# Patient Record
Sex: FEMALE
[De-identification: names, ages, dates, MRNs, and addresses within clinical notes are randomized per-mention and may not be internally consistent; named-entity substitution may affect disease eponyms.]

---

## 2020-08-16 ENCOUNTER — HOSPITAL ENCOUNTER (EMERGENCY)
Dept: HOSPITAL 88 - FSED | Age: 20
Discharge: HOME | End: 2020-08-16
Payer: COMMERCIAL

## 2020-08-16 VITALS — WEIGHT: 203 LBS | BODY MASS INDEX: 34.66 KG/M2 | HEIGHT: 64 IN

## 2020-08-16 VITALS — SYSTOLIC BLOOD PRESSURE: 128 MMHG | DIASTOLIC BLOOD PRESSURE: 68 MMHG

## 2020-08-16 DIAGNOSIS — N34.2: ICD-10-CM

## 2020-08-16 DIAGNOSIS — N30.01: Primary | ICD-10-CM

## 2020-08-16 DIAGNOSIS — R35.0: ICD-10-CM

## 2020-08-16 DIAGNOSIS — R30.0: ICD-10-CM

## 2020-08-16 PROCEDURE — 85025 COMPLETE CBC W/AUTO DIFF WBC: CPT

## 2020-08-16 PROCEDURE — 96374 THER/PROPH/DIAG INJ IV PUSH: CPT

## 2020-08-16 PROCEDURE — 74176 CT ABD & PELVIS W/O CONTRAST: CPT

## 2020-08-16 PROCEDURE — 99284 EMERGENCY DEPT VISIT MOD MDM: CPT

## 2020-08-16 PROCEDURE — 80053 COMPREHEN METABOLIC PANEL: CPT

## 2020-08-16 PROCEDURE — 87186 SC STD MICRODIL/AGAR DIL: CPT

## 2020-08-16 PROCEDURE — 87086 URINE CULTURE/COLONY COUNT: CPT

## 2020-08-16 PROCEDURE — 81003 URINALYSIS AUTO W/O SCOPE: CPT

## 2020-08-16 NOTE — XMS REPORT
Continuity of Care Document

                             Created on: 2020



MAITE JEFFERSON

External Reference #: [id number]

: 2000

Sex: Female



Demographics





                          Address                   [address]

[place]  [address]

 

                          Home Phone                [phone]

 

                          Preferred Language        English

 

                          Marital Status            Unknown

 

                          Restorationism Affiliation     Unknown

 

                          Race                      Unknown

 

                          Ethnic Group              Unknown





Author





                          Author                    [facility]

 

                          Organization              [facility]

 

                          Address                   [address]

[place]  [address]



 

                          Phone                     Unavailable







Support





                Name            Relationship    Address         Phone

 

                    NONE,  GIVEN        PRS                 [address]

[place]  [address]                     [phone]

 

                    NONE,  GIVEN        PRS                 [address]

[place]  [address]                     [phone]

 

                    MAGO KEITH [employer]                 [address]

[address]

[place], [place]  [id number] [phone]







Care Team Providers





                    Care Team Member Name Role                Phone

 

                          Unavailable               Unavailable







Payers





           Payer Name Policy Type Policy Number Effective Date Expiration Date S

ource







Problems

This patient has no known problems.



Allergies, Adverse Reactions, Alerts





        Allergy Name Allergy Type Status  Severity Reaction(s) Onset Date Inacti

ve Date 

Treating Clinician        Comments                  Source

 

       No Known Allergies DA     Active U             2020 00:00:00       

               [facility]







Medications

This patient has no known medications.



Procedures

This patient has no known procedures.



Results





           Test Description Test Time  Test Comments Results    Result Comments 

Source

 

                    Novel Coronavirus 2019 nCoV 2020 20:40:00   

 

                                        Test Item

 

             Novel Coronavirus 2019 nCoV (test code = COVID19) Negative     Nega

tive                   





Does patient have the clinical criteria consistent with COVID-19? YIs the patien
t going to be discharged home? Y- XR CHEST 1 -16-29 00:37:00 FAX:         
Nan Mcmillan NP                   [place]: B   St: REG----------
---------------------------------------------------------------------  Name:   MAITE BROWER                  [facility]                     : [birth year]  Age/S: 20/F           [address]                Unit #: [id number]    
 [place]: [place]  [address]              Phys: Nan Mcmillan  NP   
                                              Acct: [id number] Dis Date:      
        Status: REG ER                                 PHONE #: [phone]    
Exam Date:     2020                   FAX #: [phone]     
Reason: COUGH                                              EXAMS:               
                               CPT CODE:      [id number] XR CHEST 1 V            
                  [address]                    EXAM:  - XR CHEST 1 V               
HISTORY: Cough.               COMPARISON: None available time of interpretation.
              FINDINGS:               Single AP view of the chest is provided.  
    Heart size and vascularity are within normal limits.        The lungs are 
clear of focal consolidation. No effusion,        pneumothorax, or acute osseous
abnormality.                         IMPRESSION:         No radiographic evid
ence of acute cardiopulmonary process.          ** Electronically Signed by Tonny Mckinney MD on 2020 at 0037 **                      Reported and signed 
by: Thanh Mckineny MD                        CC: Nan Mcmillan  NP             
                                                                                
                       Technologist: Adán KIDD(R)                          
           Trnscrd Date/Time/By: 2020 (0037) : By: [email or web address]          
Orig Print D/T: S: 2020 (0041)                         PAGE  1            
          Signed Report                               URINALYSIS COMPLETE
2020 00:21:00* 



             Test Item    Value        Reference Range Interpretation Comments

 

             UA COLOR (test code = COLU) Light-Yellow YELLOW                    

 

 

             UA APPEARANCE (test code = APPU) CLEAR        CLEAR                

      

 

             UA GLUCOSE DIPSTICK (test code = DGLUU) NEGATIVE mg/dL NEGATIVE    

               

 

             UA BILIRUBIN DIPSTICK (test code = BILU) NEGATIVE mg/dL NEGATIVE   

                

 

             UA KETONE DIPSTICK (test code = KETU) NEGATIVE mg/dL NEGATIVE      

             

 

             UA SPECIFIC GRAVITY (test code = SGU) 1.024        1.001-1.035     

           

 

             UA BLOOD DIPSTICK (test code = CORIN) Negative mg/dL NEGATIVE        

           

 

             UA PH DIPSTICK (test code = SWATHI) 8.0          5.0-8.0              

      

 

             UA PROTEIN DIPSTICK (test code = PROU) 10 (Trace) mg/dL NEGATIVE   

  A             

 

             UA UROBILINIOGEN DIPSTICK (test code = URO) 2.0 (1+) mg/dL NEGATIVE

     A             

 

             UA NITRITE DIPSTICK (test code = CHASIDY) NEGATIVE     NEGATIVE       

            

 

                    UA LEUKOCYTE ESTERASE W REFLEX (test code = LEUUR) 25 Clara/uL

 (Trace) Clara/uL 

NEGATIVE                  A                          

 

             UA WBC (test code = WBCU)  per HPF     0-5                        

 

             UA RBC (test code = RBCU)  per HPF     0-5                        

 

             UA EPITHELIAL CELLS (test code = EPIU)  per HPF     Few            

            

 

             UA BACTERIA (test code = BACU)  per HPF     NONE                   

    





Urine Source? Clean CatchUR HCG [id number] 00:21:00* 



             Test Item    Value        Reference Range Interpretation Comments

 

             UR HCG QUAL (test code = HCGQLU) NEGATIVE                          

     This HCGQL test is NOT applicable 

for MALE patients.Check with nurse about probable order error.If Tumor Marker 
Test needed, nurse should order test "HCGTU"(Test 
#550.84921)-------------------------------------------------------





Urine Source? Clean CatchURINALYSIS [id number] 00:21:00* 



             Test Item    Value        Reference Range Interpretation Comments

 

             UA COLOR (test code = COLU) Light-Yellow YELLOW                    

 

 

             UA APPEARANCE (test code = APPU) CLEAR        CLEAR                

      

 

             UA GLUCOSE DIPSTICK (test code = DGLUU) NEGATIVE mg/dL NEGATIVE    

               

 

             UA BILIRUBIN DIPSTICK (test code = BILU) NEGATIVE mg/dL NEGATIVE   

                

 

             UA KETONE DIPSTICK (test code = KETU) NEGATIVE mg/dL NEGATIVE      

             

 

             UA SPECIFIC GRAVITY (test code = SGU) 1.024        1.001-1.035     

           

 

             UA BLOOD DIPSTICK (test code = CORIN) Negative mg/dL NEGATIVE        

           

 

             UA PH DIPSTICK (test code = SWATHI) 8.0          5.0-8.0              

      

 

             UA PROTEIN DIPSTICK (test code = PROU) 10 (Trace) mg/dL NEGATIVE   

  A             

 

             UA UROBILINIOGEN DIPSTICK (test code = URO) 2.0 (1+) mg/dL NEGATIVE

     A             

 

             UA NITRITE DIPSTICK (test code = CHASIDY) NEGATIVE     NEGATIVE       

            

 

                    UA LEUKOCYTE ESTERASE W REFLEX (test code = LEUUR) 25 Clara/uL

 (Trace) Clara/uL 

NEGATIVE                  A                          

 

             UA WBC (test code = WBCU) 0-5 per HPF  0-5                        

 

             UA RBC (test code = RBCU) 0-2 #/HPF    0-5                        

 

             UA EPITHELIAL CELLS (test code = EPIU) FEW per HPF  FEW            

            

 

             UA BACTERIA (test code = BACU) NONE SEEN #/HPF NONE                

       





Urine Source? Clean CatchUR HCG [id number] 00:21:00* 



             Test Item    Value        Reference Range Interpretation Comments

 

             UR HCG QUAL (test code = HCGQLU) NEGATIVE                          

     This HCGQL test is NOT applicable 

for MALE patients.Check with nurse about probable order error.If Tumor Marker 
Test needed, nurse should order test "HCGTU"(Test 
#550.30810)-------------------------------------------------------





Urine Source? Clean CatchURINALYSIS [id number] 00:20:00* 



             Test Item    Value        Reference Range Interpretation Comments

 

             UA COLOR (test code = COLU) Light-Yellow YELLOW                    

 

 

             UA APPEARANCE (test code = APPU) CLEAR        CLEAR                

      

 

             UA GLUCOSE DIPSTICK (test code = DGLUU) NEGATIVE mg/dL NEGATIVE    

               

 

             UA BILIRUBIN DIPSTICK (test code = BILU) NEGATIVE mg/dL NEGATIVE   

                

 

             UA KETONE DIPSTICK (test code = KETU) NEGATIVE mg/dL NEGATIVE      

             

 

             UA SPECIFIC GRAVITY (test code = SGU) 1.024        1.001-1.035     

           

 

             UA BLOOD DIPSTICK (test code = CORIN) Negative mg/dL NEGATIVE        

           

 

             UA PH DIPSTICK (test code = SWATHI) 8.0          5.0-8.0              

      

 

             UA PROTEIN DIPSTICK (test code = PROU) 10 (Trace) mg/dL NEGATIVE   

  A             

 

             UA UROBILINIOGEN DIPSTICK (test code = URO) 2.0 (1+) mg/dL NEGATIVE

     A             

 

             UA NITRITE DIPSTICK (test code = CHASIDY) NEGATIVE     NEGATIVE       

            

 

                    UA LEUKOCYTE ESTERASE W REFLEX (test code = LEUUR) 25 Clara/uL

 (Trace) Clara/uL 

NEGATIVE                  A                          

 

             UA WBC (test code = WBCU)  per HPF     0-5                        

 

             UA RBC (test code = RBCU)  per HPF     0-5                        

 

             UA EPITHELIAL CELLS (test code = EPIU)  per HPF     Few            

            

 

             UA BACTERIA (test code = BACU)  per HPF     NONE                   

    





Urine Source? Clean CatchUR HCG [id number] 00:20:00* 



             Test Item    Value        Reference Range Interpretation Comments

 

             UR HCG QUAL (test code = HCGQLU)                                   

      





Urine Source? Clean Catch

## 2020-08-16 NOTE — EMERGENCY DEPARTMENT NOTE
History of Present Illnes


History of Present Illness


Chief Complaint:  Genitourinary


History of Present Illness


This is a 20 year old transgender female (male gender), who presents with acute 

onset burning on urination as well as urinary frequency that started last 

evening approximate 6 PM. Upon awakening this morning and urinating, blood was 

noted in the urine. The urinary frequency has continued and the hematuria has 

increased in amount. She is having some suprapubic "pressure and discomfort," 

and right flank pain that started this morning. The flank pain is intermittent, 

sharp and stabbing. Patient states that she has had frequent UTIs throughout her

life, but has never been seen by urologist. Denies any fever, chills, nausea, or

vomiting. There is no penile discharge. Patient has been having anal receptive 

intercourse and denies any vaginal intercourse. Patient complains of redness and

irritation at the tip of the penis. She states that this irritation is chronic, 

but certainly worse over the last 24 hours.


Historian:  Patient


Arrival Mode:  Car


 Required:  No


Onset (how long ago):  hour(s) (15)


Location:  penis, suprapubic and right flank


Quality:  burning, aching, pain


Radiation:  Reports non-radiation


Severity:  moderate


Onset quality:  sudden


Duration (how long):  hour(s) (15)


Timing of current episode:  constant


Progression:  worsening


Chronicity:  new


Context:  Denies recent illness, Denies recent surgery, Denies trauma/injury, 

Denies new medications


Relieving factors:  none


Exacerbating factors:  none


Associated symptoms:  Reports denies other symptoms


Treatments prior to arrival:  none


Risk factors:  hx of UTI





Past Medical/Family History


Physician Review


I have reviewed the patient's past medical and family history.  Any updates have

been documented here.





Past Medical History


Recent Fever:  No


Clinical Suspicion of Infectio:  Yes


New/Unexplained Change in Ment:  No


Other Medical History:  


Hx of UTI - last in 3/2020;


Past Surgical History:  None





Social History


Smoking Cessation:  Never Smoker


Alcohol Use:  Occasional


Any Illegal Drug Use:  No


TB Exposure/Symptoms:  No


Physically hurt or threatened:  No





Family History


Family history of heart diseas:  No





Other


Any Pre-Existing Lines (PICC,:  No


Is patient up to date on immun:  No





Review of Systems


Review of Systems


Constitutional:  Denies chills, Denies fever, Denies weakness


EENTM:  Reports no symptoms


Cardiovascular:  Reports no symptoms


Respiratory:  Reports no symptoms


Gastrointestinal:  Reports abdominal pain (suprapubic;); 


   Denies constipation, Denies diarrhea, Denies nausea, Denies vomiting


Genitourinary:  Reports dysuria, Reports frequency, Reports hematuria, Reports 

pain (inflammation of tip of penis;); 


   Denies discharge


Musculoskeletal:  Reports no symptoms


Integumentary:  Reports no symptoms


Neurological:  Reports no symptoms


Psychological:  Reports no symptoms


Review of other systems:  All other systems negative





Physical Exam


Related Data


Allergies:  


Coded Allergies:  


     No Known Allergies (Unverified , 20)


Vital signs reviewed:  Yes





Physical Exam


CONSTITUTIONAL





Constitutional:  Present well-developed, Present well-nourished; 


   Absent distressed, Absent ill appearing


HENT


HENT:  Present normocephalic, Present atraumatic, Present oropharynx 

clear/moist, Present nose normal; 


   Absent rhinorrhea


HENT L/R:  Present left ext ear normal, Present right ext ear normal


EYES





Eyes:  Reports PERRL, Reports conjunctivae normal


NECK


Neck:  Present ROM normal; 


   Absent supple, Absent JVD, Absent cervical adenopathy


PULMONARY


Pulmonary:  Present effort normal, Present breath sounds normal


CARDIOVASCULAR





Cardiovascular:  Present regular rhythm, Present heart sounds normal, Present 

capillary refill normal, Present normal rate; 


   Absent murmur


GASTROINTESTINAL





Abdominal:  Present soft, Present nontender, Present bowel sounds normal, 

Present right CVA tenderness


GENITOURINARY





Genitourinary:  Present other (uncircumcised male, testes descended bilaterally 

with no masses, mildly edematous, erythematous, tender, tip of the penis, with 

no lesions or discharge;)


SKIN


Skin:  Present warm, Present dry; 


   Absent rash


MUSCULOSKELETAL





Musculoskeletal:  Present ROM normal


NEUROLOGICAL





Neurological:  Present alert, Present oriented x 3, Present no gross motor or 

sensory deficits


PSYCHOLOGICAL


Psychological:  Present mood/affect normal, Present judgement normal





Results


Laboratory


Laboratory


UA - pito- moderate, ket - trace, blo - large, pro- > 300 mg/dl, nit - positive, 

jaclyn - large; 


CBC - nl except for WBC - 13.8, H/H = 12.8/37.9;


CMP - nl except for K+ = 3.4;


Lab results reviewed:  Yes





Imaging


Imaging results reviewed:  Yes


Impressions


                                        


                        [place] [address]








Patient Name: MAITE JEFFERSON                          MR #: [id number]         


    


: 2000                         Age/Sex: 20/F


Acct #: [id number]                    Req #: [id number]


[facility] Physician:                                      


Ordered by: SOFIYA PADILLA MD                    Report #: [id number] 


Location: [facility]                          Room/Bed:           


  ______________________________________________________________________________


_____________________





Procedure: [id number] HOPD/CT ABD/PEL WO CONTRAST-HOPD


Exam Date: 20                            Exam Time: 0856








                              REPORT STATUS: Signed





EXAM: CT Abdomen and Pelvis WITHOUT contrast  


INDICATION: Right flank pain and hematuria.


COMPARISON: None.


TECHNIQUE: Abdomen and pelvis were scanned utilizing a multidetector helical


scanner from the lung base to the pubic symphysis without administration of IV


contrast. Absence of intravenous contrast decreases sensitivity for detection


of focal lesions and vascular pathology. Coronal and sagittal reformations were


obtained. Routine protocol was performed. 


     IV CONTRAST: None


     ORAL CONTRAST: None


            


COMPLICATIONS: None





RADIATION DOSE:


     Total DLP: 829.47 mGy*cm


     Estimated effective dose: (DLP x 0.015 x size factor) mSv


     CTDIvol has been reviewed. It is below the limits set by the Radiation


Protocol Committee (RPC).


     Dose modulation, iterative reconstruction, and/or weight based adjustment


of the mA/kV was utilized to reduce the radiation dose to as low as reasonably


achievable. 





FINDINGS:





LINES and TUBES: None.





LOWER THORAX:  Unremarkable





HEPATOBILIARY:      No focal hepatic lesions. No biliary ductal dilation. 





GALLBLADDER: No radio-opaque stones or sludge.  No wall thickening.





SPLEEN: No splenomegaly. 





PANCREAS: No focal masses or ductal dilatation.  





ADRENALS: No adrenal nodules    





KIDNEYS/URETERS:  No hydronephrosis. No cystic or solid mass lesions.  No


stones.





GI TRACT: No abnormal distention, wall thickening, or evidence of bowel


obstruction.       Appendix is normal.





PELVIC ORGANS/BLADDER: Unremarkable.





LYMPH NODES: There are multiple prominent lymph nodes in the right lower


quadrant, none of which meet criteria for pathologic enlargement.





VESSELS: Unremarkable.





PERITONEUM / RETROPERITONEUM: No free air or fluid.





BONES: Unremarkable.





SOFT TISSUES: Unremarkable.            





IMPRESSION: 


1.  No evidence of renal stones or hydroureteronephrosis bilaterally.


2.  Multiple prominent lymph nodes in the right lower quadrant which can be


seen with mesenteric adenitis, a self-limiting inflammatory process that can


present clinically with right lower quadrant pain.





Signed by: Smith Camp MD on 2020 9:24 AM





Assessment & Plan


Medical Decision Making


MDM


 - Increase water intake, recommended at least 1 gallon of water per day for the

next several days until infection is cleared





 - Take the Pyridium as directed, and follow-up if your symptoms persist after 

being on antibiotics for 48 hours. Return to the emergency room if symptoms 

worsen.





 - It is very important to follow-up with urology, regarding history of 

recurrent urinary tract infection. Please take the copies of diagnostics 

performed today in the ED with you to that visit.





Reassessment


Reassessment time:  10:00


Reassessment


Pt feeling much better! Still having urinary frequency, but pain is decreased 

and no hematuria.





Assessment & Plan


Final Impression:  


(1) Acute hemorrhagic cystitis


(2) Urethritis


(3) Urinary frequency


(4) Dysuria


Depart Disposition:  HOME, SELF-CARE


Home Meds


Active Scripts


Phenazopyridine Hcl (PHENAZOPYRIDINE HCL) 200 Mg Tablet, 1 TAB PO BID for 

urinary tract infection for 10 Days, #20 0 Refills


   Prov:SOFIYA PADILLA MD         20


Cefdinir (OMNICEF) 300 Mg Capsule, 300 MG PO BID for urinary tract infection for

10 Days, #20 CAP 0 Refills


   Prov:SOFIYA PADILLA MD         20


Medications in the ED





Sodium Chloride 1,000 ml @  0 mls/hr Q0M IV ;  Start 20 at 08:45;  Stop 

9/15/20 at 08:44;  Status UNV


Ceftriaxone Sodium 50 ml @  100 mls/hr ONCE  ONCE IV ;  Start 20 at 08:45; 

Stop 20 at 09:14;  Status UNV


Phenazopyridine HCl 200 mg PC PO ;  Start 20 at 09:00;  Stop 9/15/20 at 

08:59;  Status UNV











SOFIYA PADILLA MD              Aug 16, 2020 09:03

## 2020-08-16 NOTE — DIAGNOSTIC IMAGING REPORT
EXAM: CT Abdomen and Pelvis WITHOUT contrast  

INDICATION: Right flank pain and hematuria.

COMPARISON: None.

TECHNIQUE: Abdomen and pelvis were scanned utilizing a multidetector helical

scanner from the lung base to the pubic symphysis without administration of IV

contrast. Absence of intravenous contrast decreases sensitivity for detection

of focal lesions and vascular pathology. Coronal and sagittal reformations were

obtained. Routine protocol was performed. 

     IV CONTRAST: None

     ORAL CONTRAST: None

            

COMPLICATIONS: None



RADIATION DOSE:

     Total DLP: 829.47 mGy*cm

     Estimated effective dose: (DLP x 0.015 x size factor) mSv

     CTDIvol has been reviewed. It is below the limits set by the Radiation

Protocol Committee (RPC).

     Dose modulation, iterative reconstruction, and/or weight based adjustment

of the mA/kV was utilized to reduce the radiation dose to as low as reasonably

achievable. 



FINDINGS:



LINES and TUBES: None.



LOWER THORAX:  Unremarkable



HEPATOBILIARY:      No focal hepatic lesions. No biliary ductal dilation. 



GALLBLADDER: No radio-opaque stones or sludge.  No wall thickening.



SPLEEN: No splenomegaly. 



PANCREAS: No focal masses or ductal dilatation.  



ADRENALS: No adrenal nodules    



KIDNEYS/URETERS:  No hydronephrosis. No cystic or solid mass lesions.  No

stones.



GI TRACT: No abnormal distention, wall thickening, or evidence of bowel

obstruction.       Appendix is normal.



PELVIC ORGANS/BLADDER: Unremarkable.



LYMPH NODES: There are multiple prominent lymph nodes in the right lower

quadrant, none of which meet criteria for pathologic enlargement.



VESSELS: Unremarkable.



PERITONEUM / RETROPERITONEUM: No free air or fluid.



BONES: Unremarkable.



SOFT TISSUES: Unremarkable.            



IMPRESSION: 

1.  No evidence of renal stones or hydroureteronephrosis bilaterally.

2.  Multiple prominent lymph nodes in the right lower quadrant which can be

seen with mesenteric adenitis, a self-limiting inflammatory process that can

present clinically with right lower quadrant pain.



Signed by: Smith Camp MD on 8/16/2020 9:24 AM

## 2021-09-04 ENCOUNTER — HOSPITAL ENCOUNTER (OUTPATIENT)
Dept: HOSPITAL 53 - M LABSMTC | Age: 21
End: 2021-09-04
Attending: PEDIATRICS

## 2021-09-04 DIAGNOSIS — Z20.822: Primary | ICD-10-CM

## 2021-09-29 ENCOUNTER — HOSPITAL ENCOUNTER (OUTPATIENT)
Dept: HOSPITAL 53 - M EMP | Age: 21
End: 2021-09-29
Attending: FAMILY MEDICINE

## 2021-09-29 DIAGNOSIS — Z11.52: Primary | ICD-10-CM

## 2022-01-21 ENCOUNTER — HOSPITAL ENCOUNTER (EMERGENCY)
Dept: HOSPITAL 53 - M ED | Age: 22
LOS: 1 days | Discharge: HOME | End: 2022-01-22
Payer: SELF-PAY

## 2022-01-21 VITALS — HEIGHT: 65 IN | BODY MASS INDEX: 35.26 KG/M2 | WEIGHT: 211.64 LBS

## 2022-01-21 DIAGNOSIS — E66.9: ICD-10-CM

## 2022-01-21 DIAGNOSIS — U07.1: ICD-10-CM

## 2022-01-21 DIAGNOSIS — J02.9: ICD-10-CM

## 2022-01-21 DIAGNOSIS — R50.9: Primary | ICD-10-CM

## 2022-01-21 DIAGNOSIS — R05.9: ICD-10-CM

## 2022-01-21 PROCEDURE — 99283 EMERGENCY DEPT VISIT LOW MDM: CPT

## 2022-01-22 VITALS — SYSTOLIC BLOOD PRESSURE: 132 MMHG | DIASTOLIC BLOOD PRESSURE: 77 MMHG

## 2022-04-02 ENCOUNTER — HOSPITAL ENCOUNTER (EMERGENCY)
Dept: HOSPITAL 53 - M ED | Age: 22
Discharge: HOME | End: 2022-04-02
Payer: COMMERCIAL

## 2022-04-02 VITALS — SYSTOLIC BLOOD PRESSURE: 125 MMHG | DIASTOLIC BLOOD PRESSURE: 69 MMHG

## 2022-04-02 VITALS — WEIGHT: 195.11 LBS | HEIGHT: 64 IN | BODY MASS INDEX: 33.31 KG/M2

## 2022-04-02 DIAGNOSIS — H10.9: Primary | ICD-10-CM

## 2022-04-13 ENCOUNTER — HOSPITAL ENCOUNTER (EMERGENCY)
Dept: HOSPITAL 88 - ER | Age: 22
Discharge: HOME | End: 2022-04-13
Payer: COMMERCIAL

## 2022-04-13 VITALS — WEIGHT: 203 LBS | BODY MASS INDEX: 34.66 KG/M2 | HEIGHT: 64 IN

## 2022-04-13 VITALS — SYSTOLIC BLOOD PRESSURE: 127 MMHG | DIASTOLIC BLOOD PRESSURE: 81 MMHG

## 2022-04-13 DIAGNOSIS — R11.0: ICD-10-CM

## 2022-04-13 DIAGNOSIS — R10.13: Primary | ICD-10-CM

## 2022-04-13 DIAGNOSIS — R10.33: ICD-10-CM

## 2022-04-13 LAB
ALBUMIN SERPL-MCNC: 4 G/DL (ref 3.5–5)
ALBUMIN/GLOB SERPL: 1.1 {RATIO} (ref 0.8–2)
ALP SERPL-CCNC: 72 IU/L (ref 40–150)
ALT SERPL-CCNC: 59 IU/L (ref 0–55)
AMYLASE SERPL-CCNC: 62 U/L (ref 25–125)
ANION GAP SERPL CALC-SCNC: 13.7 MMOL/L (ref 8–16)
BACTERIA URNS QL MICRO: (no result) /HPF
BASOPHILS # BLD AUTO: 0 10*3/UL (ref 0–0.1)
BASOPHILS NFR BLD AUTO: 0.3 % (ref 0–1)
BUN SERPL-MCNC: 17 MG/DL (ref 7–26)
BUN/CREAT SERPL: 22 (ref 6–25)
CALCIUM SERPL-MCNC: 8.6 MG/DL (ref 8.4–10.2)
CHLORIDE SERPL-SCNC: 106 MMOL/L (ref 98–107)
CLARITY UR: CLEAR
CO2 SERPL-SCNC: 22 MMOL/L (ref 22–29)
COLOR UR: YELLOW
DEPRECATED NEUTROPHILS # BLD AUTO: 12.1 10*3/UL (ref 2.1–6.9)
DEPRECATED RBC URNS MANUAL-ACNC: (no result) /HPF (ref 0–5)
EGFRCR SERPLBLD CKD-EPI 2021: 95 ML/MIN (ref 60–?)
EOSINOPHIL # BLD AUTO: 0.1 10*3/UL (ref 0–0.4)
EOSINOPHIL NFR BLD AUTO: 0.7 % (ref 0–6)
EPI CELLS URNS QL MICRO: (no result) /LPF
ERYTHROCYTE [DISTWIDTH] IN CORD BLOOD: 14.6 % (ref 11.7–14.4)
GLOBULIN PLAS-MCNC: 3.5 G/DL (ref 2.3–3.5)
GLUCOSE SERPLBLD-MCNC: 125 MG/DL (ref 74–118)
HCT VFR BLD AUTO: 39.2 % (ref 34.2–44.1)
HGB BLD-MCNC: 13 G/DL (ref 12–16)
KETONES UR QL STRIP.AUTO: (no result)
LEUKOCYTE ESTERASE UR QL STRIP.AUTO: NEGATIVE
LIPASE SERPL-CCNC: 16 U/L (ref 8–78)
LYMPHOCYTES # BLD: 0.8 10*3/UL (ref 1–3.2)
LYMPHOCYTES NFR BLD AUTO: 5.9 % (ref 18–39.1)
MCH RBC QN AUTO: 26.7 PG (ref 28–32)
MCHC RBC AUTO-ENTMCNC: 33.2 G/DL (ref 31–35)
MCV RBC AUTO: 80.5 FL (ref 81–99)
MONOCYTES # BLD AUTO: 0.7 10*3/UL (ref 0.2–0.8)
MONOCYTES NFR BLD AUTO: 4.8 % (ref 4.4–11.3)
NEUTS SEG NFR BLD AUTO: 87.9 % (ref 38.7–80)
NITRITE UR QL STRIP.AUTO: NEGATIVE
PLATELET # BLD AUTO: 280 X10E3/UL (ref 140–360)
POTASSIUM SERPL-SCNC: 3.7 MMOL/L (ref 3.5–5.1)
PROT UR QL STRIP.AUTO: NEGATIVE
RBC # BLD AUTO: 4.87 X10E6/UL (ref 3.6–5.1)
SODIUM SERPL-SCNC: 138 MMOL/L (ref 136–145)
SP GR UR STRIP: 1.02 (ref 1.01–1.02)
UROBILINOGEN UR STRIP-MCNC: 0.2 MG/DL (ref 0.2–1)
WBC #/AREA URNS HPF: (no result) /HPF (ref 0–5)

## 2022-04-13 PROCEDURE — 99284 EMERGENCY DEPT VISIT MOD MDM: CPT

## 2022-04-13 PROCEDURE — 81025 URINE PREGNANCY TEST: CPT

## 2022-04-13 PROCEDURE — 80053 COMPREHEN METABOLIC PANEL: CPT

## 2022-04-13 PROCEDURE — 85025 COMPLETE CBC W/AUTO DIFF WBC: CPT

## 2022-04-13 PROCEDURE — 82150 ASSAY OF AMYLASE: CPT

## 2022-04-13 PROCEDURE — 36415 COLL VENOUS BLD VENIPUNCTURE: CPT

## 2022-04-13 PROCEDURE — 83690 ASSAY OF LIPASE: CPT

## 2022-04-13 PROCEDURE — 81001 URINALYSIS AUTO W/SCOPE: CPT

## 2022-04-13 PROCEDURE — 76705 ECHO EXAM OF ABDOMEN: CPT
